# Patient Record
(demographics unavailable — no encounter records)

---

## 2025-05-19 NOTE — PHYSICAL EXAM
[Erythematous Oropharynx] : erythematous oropharynx [NL] : warm, clear [Vesicles] : no vesicles [Exudate] : no exudate [Ulcerative Lesions] : no ulcerative lesions [Palate petechiae] : palate without petechiae [Wheezing] : no wheezing [Rales] : no rales [Tachypnea] : no tachypnea [Rhonchi] : no rhonchi [de-identified] : right tonsil 1+

## 2025-05-19 NOTE — REVIEW OF SYSTEMS
[Headache] : headache [Nasal Discharge] : nasal discharge [Sore Throat] : sore throat [Negative] : Genitourinary [Fever] : no fever

## 2025-05-19 NOTE — DISCUSSION/SUMMARY
[FreeTextEntry1] : Discussed with parent pharyngitis. May be due to virial illness vs allergies/post nasal drip Rapid strep negative Throat culture sent to rule out strep.  If positive, will start Amoxicillin 500 mg tabs BID x 10 days Recommend supportive care with Tylenol or Motrin for fever or discomfort, increased fluids, salt water gargles, throat lozenges, tea with honey, cool mist humidifier Flonase and Zyrtec or Xyzal for allergy symptoms Return to office if symptoms worsen or persist

## 2025-05-19 NOTE — HISTORY OF PRESENT ILLNESS
[de-identified] : mom reports pt w ST and HA x 2 days, +UO, appetite OK, afebrile, pt otherwise well  [FreeTextEntry6] : Sore throat and headache x 2 days Took Motrin this morning Runny nose and itchy eyes x 2 weeks, tried Claritin initially- did not help No fever No cough. No SOB, difficulty breathing, chest pain, wheeze or stridor. No nausea, vomiting, diarrhea No abdominal pain, rash. No body aches or fatigue. Good po/urine output/bm No sick contacts